# Patient Record
Sex: FEMALE | Race: OTHER
[De-identification: names, ages, dates, MRNs, and addresses within clinical notes are randomized per-mention and may not be internally consistent; named-entity substitution may affect disease eponyms.]

---

## 2017-04-19 ENCOUNTER — APPOINTMENT (OUTPATIENT)
Dept: OTOLARYNGOLOGY | Facility: CLINIC | Age: 62
End: 2017-04-19

## 2017-04-19 VITALS
HEART RATE: 94 BPM | OXYGEN SATURATION: 97 % | DIASTOLIC BLOOD PRESSURE: 80 MMHG | SYSTOLIC BLOOD PRESSURE: 126 MMHG | TEMPERATURE: 99.1 F

## 2017-04-19 DIAGNOSIS — J32.2 CHRONIC ETHMOIDAL SINUSITIS: ICD-10-CM

## 2017-04-19 RX ORDER — SULFAMETHOXAZOLE AND TRIMETHOPRIM 800; 160 MG/1; MG/1
800-160 TABLET ORAL
Qty: 20 | Refills: 0 | Status: ACTIVE | COMMUNITY
Start: 2017-04-19 | End: 1900-01-01

## 2017-04-19 RX ORDER — MONTELUKAST 10 MG/1
10 TABLET, FILM COATED ORAL
Qty: 30 | Refills: 6 | Status: ACTIVE | COMMUNITY
Start: 2017-04-19 | End: 1900-01-01

## 2017-05-10 ENCOUNTER — APPOINTMENT (OUTPATIENT)
Dept: OTOLARYNGOLOGY | Facility: CLINIC | Age: 62
End: 2017-05-10

## 2017-05-10 VITALS
HEART RATE: 92 BPM | SYSTOLIC BLOOD PRESSURE: 108 MMHG | TEMPERATURE: 98.5 F | OXYGEN SATURATION: 98 % | DIASTOLIC BLOOD PRESSURE: 73 MMHG

## 2017-05-10 DIAGNOSIS — J06.9 ACUTE UPPER RESPIRATORY INFECTION, UNSPECIFIED: ICD-10-CM

## 2017-09-25 ENCOUNTER — MEDICATION RENEWAL (OUTPATIENT)
Age: 62
End: 2017-09-25

## 2017-09-25 RX ORDER — MONTELUKAST 10 MG/1
10 TABLET, FILM COATED ORAL
Qty: 30 | Refills: 6 | Status: ACTIVE | COMMUNITY
Start: 2017-09-25 | End: 1900-01-01

## 2018-01-25 RX ORDER — AZITHROMYCIN 250 MG/1
250 TABLET, FILM COATED ORAL
Qty: 6 | Refills: 1 | Status: ACTIVE | COMMUNITY
Start: 2018-01-25 | End: 1900-01-01

## 2018-02-14 ENCOUNTER — APPOINTMENT (OUTPATIENT)
Dept: OTOLARYNGOLOGY | Facility: CLINIC | Age: 63
End: 2018-02-14
Payer: SELF-PAY

## 2018-02-14 VITALS
HEART RATE: 111 BPM | DIASTOLIC BLOOD PRESSURE: 82 MMHG | OXYGEN SATURATION: 96 % | TEMPERATURE: 98.4 F | SYSTOLIC BLOOD PRESSURE: 124 MMHG

## 2018-02-14 DIAGNOSIS — H92.03 OTALGIA, BILATERAL: ICD-10-CM

## 2018-02-14 PROCEDURE — 99214 OFFICE O/P EST MOD 30 MIN: CPT | Mod: 25

## 2018-02-14 PROCEDURE — 31231 NASAL ENDOSCOPY DX: CPT

## 2018-02-14 RX ORDER — TOPIRAMATE 50 MG/1
50 TABLET, FILM COATED ORAL
Qty: 30 | Refills: 0 | Status: ACTIVE | COMMUNITY
Start: 2017-11-24

## 2018-02-14 RX ORDER — CIPROFLOXACIN HYDROCHLORIDE 500 MG/1
500 TABLET, FILM COATED ORAL
Qty: 10 | Refills: 0 | Status: ACTIVE | COMMUNITY
Start: 2017-11-15

## 2018-02-14 RX ORDER — SULFAMETHOXAZOLE AND TRIMETHOPRIM 800; 160 MG/1; MG/1
800-160 TABLET ORAL
Qty: 20 | Refills: 0 | Status: ACTIVE | COMMUNITY
Start: 2018-02-14 | End: 1900-01-01

## 2018-02-14 RX ORDER — SUMATRIPTAN 100 MG/1
100 TABLET, FILM COATED ORAL
Qty: 9 | Refills: 0 | Status: ACTIVE | COMMUNITY
Start: 2017-09-26

## 2018-02-14 RX ORDER — METFORMIN ER 500 MG 500 MG/1
500 TABLET ORAL
Qty: 120 | Refills: 0 | Status: ACTIVE | COMMUNITY
Start: 2017-11-24

## 2018-02-14 RX ORDER — PEN NEEDLE, DIABETIC 29 G X1/2"
32G X 4 MM NEEDLE, DISPOSABLE MISCELLANEOUS
Qty: 100 | Refills: 0 | Status: ACTIVE | COMMUNITY
Start: 2017-11-03

## 2018-02-14 RX ORDER — TOPIRAMATE 25 MG/1
25 TABLET, FILM COATED ORAL
Qty: 60 | Refills: 0 | Status: ACTIVE | COMMUNITY
Start: 2017-08-27

## 2018-02-14 RX ORDER — OLMESARTAN MEDOXOMIL 40 MG/1
40 TABLET, FILM COATED ORAL
Qty: 30 | Refills: 0 | Status: ACTIVE | COMMUNITY
Start: 2017-10-24

## 2018-02-14 RX ORDER — ROSUVASTATIN CALCIUM 10 MG/1
10 TABLET, FILM COATED ORAL
Qty: 30 | Refills: 0 | Status: ACTIVE | COMMUNITY
Start: 2017-10-24

## 2018-02-14 RX ORDER — LIRAGLUTIDE 6 MG/ML
18 INJECTION SUBCUTANEOUS
Qty: 9 | Refills: 0 | Status: ACTIVE | COMMUNITY
Start: 2017-09-07

## 2018-02-21 ENCOUNTER — APPOINTMENT (OUTPATIENT)
Dept: OTOLARYNGOLOGY | Facility: CLINIC | Age: 63
End: 2018-02-21
Payer: SELF-PAY

## 2018-02-21 DIAGNOSIS — J30.1 ALLERGIC RHINITIS DUE TO POLLEN: ICD-10-CM

## 2018-02-21 PROCEDURE — 99213 OFFICE O/P EST LOW 20 MIN: CPT

## 2018-02-22 PROBLEM — J30.1 ALLERGY TO POLLEN: Status: ACTIVE | Noted: 2017-04-19

## 2018-02-24 ENCOUNTER — TRANSCRIPTION ENCOUNTER (OUTPATIENT)
Age: 63
End: 2018-02-24

## 2018-09-25 ENCOUNTER — APPOINTMENT (OUTPATIENT)
Dept: OTOLARYNGOLOGY | Facility: CLINIC | Age: 63
End: 2018-09-25
Payer: COMMERCIAL

## 2018-09-25 VITALS
TEMPERATURE: 98.3 F | OXYGEN SATURATION: 98 % | RESPIRATION RATE: 15 BRPM | HEART RATE: 109 BPM | DIASTOLIC BLOOD PRESSURE: 93 MMHG | SYSTOLIC BLOOD PRESSURE: 137 MMHG

## 2018-09-25 DIAGNOSIS — H90.3 SENSORINEURAL HEARING LOSS, BILATERAL: ICD-10-CM

## 2018-09-25 PROCEDURE — 92550 TYMPANOMETRY & REFLEX THRESH: CPT

## 2018-09-25 PROCEDURE — 99214 OFFICE O/P EST MOD 30 MIN: CPT

## 2018-09-25 PROCEDURE — 92557 COMPREHENSIVE HEARING TEST: CPT

## 2018-09-25 RX ORDER — MONTELUKAST 10 MG/1
10 TABLET, FILM COATED ORAL DAILY
Qty: 30 | Refills: 3 | Status: ACTIVE | COMMUNITY
Start: 2018-09-25 | End: 1900-01-01

## 2018-10-17 RX ORDER — AZITHROMYCIN 250 MG/1
250 TABLET, FILM COATED ORAL
Qty: 6 | Refills: 1 | Status: ACTIVE | COMMUNITY
Start: 2018-10-17 | End: 1900-01-01

## 2019-06-13 ENCOUNTER — APPOINTMENT (OUTPATIENT)
Dept: OTOLARYNGOLOGY | Facility: CLINIC | Age: 64
End: 2019-06-13
Payer: SELF-PAY

## 2019-06-13 VITALS
SYSTOLIC BLOOD PRESSURE: 138 MMHG | DIASTOLIC BLOOD PRESSURE: 83 MMHG | TEMPERATURE: 97.9 F | HEART RATE: 82 BPM | OXYGEN SATURATION: 98 %

## 2019-06-13 DIAGNOSIS — H61.22 IMPACTED CERUMEN, LEFT EAR: ICD-10-CM

## 2019-06-13 DIAGNOSIS — H90.3 SENSORINEURAL HEARING LOSS, BILATERAL: ICD-10-CM

## 2019-06-13 PROCEDURE — 99213 OFFICE O/P EST LOW 20 MIN: CPT

## 2019-06-14 PROBLEM — H90.3 SENSORY HEARING LOSS, BILATERAL: Status: ACTIVE | Noted: 2019-06-14

## 2019-06-14 PROBLEM — H61.22 CERUMINOSIS, LEFT: Status: ACTIVE | Noted: 2019-06-14

## 2019-06-14 NOTE — HISTORY OF PRESENT ILLNESS
[de-identified] : followup 65 yo woman with b snhl, severe-profound had been doing well with left Maxum middle ear implant until the past 2 weeks where she heard some unusual noise from the processor. She heard it briefly twice and is concerned the processor may be failing. She denies pain or drainage and also wished to discuss other options. No inciting factor. She has h/o recurrent sinusitis and ar but her nose has been relatively stable. Nonsmoker.

## 2019-06-14 NOTE — PHYSICAL EXAM
[de-identified] : b [de-identified] : l copious cerumen removed with suction [Normal] : mucosa is normal

## 2019-07-29 RX ORDER — MONTELUKAST 10 MG/1
10 TABLET, FILM COATED ORAL DAILY
Qty: 30 | Refills: 3 | Status: ACTIVE | COMMUNITY
Start: 2019-07-29 | End: 1900-01-01

## 2019-07-30 ENCOUNTER — TRANSCRIPTION ENCOUNTER (OUTPATIENT)
Age: 64
End: 2019-07-30

## 2019-09-20 ENCOUNTER — APPOINTMENT (OUTPATIENT)
Dept: OTOLARYNGOLOGY | Facility: CLINIC | Age: 64
End: 2019-09-20
Payer: SELF-PAY

## 2019-09-20 VITALS
RESPIRATION RATE: 16 BRPM | DIASTOLIC BLOOD PRESSURE: 75 MMHG | HEART RATE: 110 BPM | SYSTOLIC BLOOD PRESSURE: 118 MMHG | OXYGEN SATURATION: 97 %

## 2019-09-20 DIAGNOSIS — J30.1 ALLERGIC RHINITIS DUE TO POLLEN: ICD-10-CM

## 2019-09-20 DIAGNOSIS — H61.23 IMPACTED CERUMEN, BILATERAL: ICD-10-CM

## 2019-09-20 DIAGNOSIS — J32.2 CHRONIC ETHMOIDAL SINUSITIS: ICD-10-CM

## 2019-09-20 PROCEDURE — 99214 OFFICE O/P EST MOD 30 MIN: CPT | Mod: 25

## 2019-09-20 PROCEDURE — 31231 NASAL ENDOSCOPY DX: CPT

## 2019-09-20 NOTE — REASON FOR VISIT
[Subsequent Evaluation] : a subsequent evaluation for [FreeTextEntry2] : followup nasl congestion, blocked ears

## 2019-09-20 NOTE — HISTORY OF PRESENT ILLNESS
[de-identified] : followup 89 yo woman with nasal congestion - treated for sinus infection while traveling in [place] by 's relatives, feels as if her sinusitis has resolved but wishes to have nose checked to be sure. -f.s.c no draiange. mild congestion b. sx present 1 month resolved 1 week ago. nonsmoker.Has h/o severe-profound hl.

## 2019-09-20 NOTE — PHYSICAL EXAM
[Nasal Endoscopy Performed] : nasal endoscopy was performed, see procedure section for findings [de-identified] : mild congestion [Midline] : trachea located in midline position [Normal] : no rashes

## 2019-09-20 NOTE — PROCEDURE
[Topical Lidocaine] : topical lidocaine [Oxymetazoline HCl] : oxymetazoline HCl [Flexible Endoscope] : examined with the flexible endoscope [Serial Number: ___] : Serial Number: [unfilled] [Allergic] : allergic signs [Normal] : the paranasal sinuses had no abnormalities [FreeTextEntry6] : mild allergic congestion no purulence

## 2019-11-25 ENCOUNTER — APPOINTMENT (OUTPATIENT)
Dept: OTOLARYNGOLOGY | Facility: CLINIC | Age: 64
End: 2019-11-25
Payer: SELF-PAY

## 2019-11-25 ENCOUNTER — APPOINTMENT (OUTPATIENT)
Dept: OTOLARYNGOLOGY | Facility: CLINIC | Age: 64
End: 2019-11-25
Payer: COMMERCIAL

## 2019-11-25 VITALS
OXYGEN SATURATION: 96 % | DIASTOLIC BLOOD PRESSURE: 85 MMHG | TEMPERATURE: 98.3 F | SYSTOLIC BLOOD PRESSURE: 139 MMHG | HEART RATE: 81 BPM

## 2019-11-25 DIAGNOSIS — H90.3 SENSORINEURAL HEARING LOSS, BILATERAL: ICD-10-CM

## 2019-11-25 PROCEDURE — 92557 COMPREHENSIVE HEARING TEST: CPT

## 2019-11-25 PROCEDURE — 99212 OFFICE O/P EST SF 10 MIN: CPT

## 2019-11-25 PROCEDURE — 92550 TYMPANOMETRY & REFLEX THRESH: CPT

## 2019-11-26 PROBLEM — H90.3 SENSORY HEARING LOSS, BILATERAL: Status: ACTIVE | Noted: 2018-02-14

## 2019-11-26 NOTE — HISTORY OF PRESENT ILLNESS
[de-identified] : followup 65 yo woman with b snhl and meneire's and ar - currently she is here only for her hl which she feels has gotten worse. no inciting event. not sudden - it was slow progression over months from prior poor hearing.

## 2022-09-22 ENCOUNTER — APPOINTMENT (OUTPATIENT)
Dept: UROLOGY | Facility: CLINIC | Age: 67
End: 2022-09-22

## 2022-09-22 VITALS
WEIGHT: 178 LBS | HEART RATE: 115 BPM | BODY MASS INDEX: 29.66 KG/M2 | HEIGHT: 65 IN | TEMPERATURE: 97.2 F | SYSTOLIC BLOOD PRESSURE: 117 MMHG | OXYGEN SATURATION: 96 % | DIASTOLIC BLOOD PRESSURE: 88 MMHG

## 2022-09-22 DIAGNOSIS — N39.0 URINARY TRACT INFECTION, SITE NOT SPECIFIED: ICD-10-CM

## 2022-09-22 PROCEDURE — 99204 OFFICE O/P NEW MOD 45 MIN: CPT

## 2022-09-22 PROCEDURE — 51798 US URINE CAPACITY MEASURE: CPT

## 2022-09-22 NOTE — REVIEW OF SYSTEMS
[see HPI] : see HPI [Negative] : Heme/Lymph [Heartburn] : heartburn [Itching] : itching [Hot Flashes] : hot flashes

## 2022-09-23 LAB
APPEARANCE: CLEAR
BACTERIA: NEGATIVE
BILIRUBIN URINE: NEGATIVE
BLOOD URINE: NEGATIVE
COLOR: NORMAL
GLUCOSE QUALITATIVE U: NEGATIVE
HYALINE CASTS: 0 /LPF
KETONES URINE: NEGATIVE
LEUKOCYTE ESTERASE URINE: NEGATIVE
MICROSCOPIC-UA: NORMAL
NITRITE URINE: NEGATIVE
PH URINE: 6
PROTEIN URINE: NEGATIVE
RED BLOOD CELLS URINE: 0 /HPF
SPECIFIC GRAVITY URINE: 1.01
SQUAMOUS EPITHELIAL CELLS: 0 /HPF
UROBILINOGEN URINE: NORMAL
WHITE BLOOD CELLS URINE: 0 /HPF

## 2022-09-26 ENCOUNTER — APPOINTMENT (OUTPATIENT)
Dept: UROLOGY | Facility: CLINIC | Age: 67
End: 2022-09-26

## 2022-09-26 DIAGNOSIS — R35.0 FREQUENCY OF MICTURITION: ICD-10-CM

## 2022-09-26 LAB — BACTERIA UR CULT: NORMAL

## 2022-09-26 PROCEDURE — ZZZZZ: CPT | Mod: NC

## 2022-09-26 RX ORDER — MIRABEGRON 25 MG/1
25 TABLET, FILM COATED, EXTENDED RELEASE ORAL
Qty: 30 | Refills: 1 | Status: ACTIVE | COMMUNITY
Start: 2022-09-26 | End: 1900-01-01

## 2022-09-26 NOTE — REVIEW OF SYSTEMS
[Heartburn] : heartburn [see HPI] : see HPI [Itching] : itching [Hot Flashes] : hot flashes [Negative] : Heme/Lymph

## 2022-09-27 NOTE — ASSESSMENT
[FreeTextEntry1] : Pt is a 68 yo F who presents with frequency, urgency and sensation of burning while urinating.\par \par Assessment/ plan:   \par \par                            1- Send urine for analysis and culture to R/O infection\par                            2- If urine analysis positive RBC she will need cystoscopy\par                            3- Will follow up on Monday for culture results\par

## 2022-09-27 NOTE — LETTER BODY
[Dear  ___] : Dear  [unfilled], [Consult Letter:] : I had the pleasure of evaluating your patient, [unfilled]. [Please see my note below.] : Please see my note below. [Consult Closing:] : Thank you very much for allowing me to participate in the care of this patient.  If you have any questions, please do not hesitate to contact me. [Sincerely,] : Sincerely, [FreeTextEntry3] : Dr. Traci Dallas\par

## 2022-09-27 NOTE — ADDENDUM
[FreeTextEntry1] : A portion of this note was written by [Deepti Perez] on 09/22/2022  acting as a scribe for Dr. Dallas. \par \par I have personally reviewed the chart and agree that the record accurately reflects my personal performance and the history, physical exam, assessment, and plan.\par

## 2022-09-27 NOTE — HISTORY OF PRESENT ILLNESS
[FreeTextEntry1] : Pt is a 68 yo F  () who presents today with urinary frequency, referred by Dr. Natalya Asencio. Her history dates back 3 months when she started having urinary frequency, urgency and a sensation of burning (or inflamed urethra as she described) while urinating. Her GYN started her on nitrofurantoin and her symptoms slowly improved.  1 month ago the same symptoms returned and a culture was negative.  However, she was given another round of antibiotics, (nitrofurantoin).  Today she is presenting with the same symptoms. \par \par Udip: negative\par \par PMH: high blood pressure, sinusitis\par PSH:  (), sinus surgery (), cochlear implant ()\par FH: denies genitourinary malignancies\par SH: non-smoker, no alcohol, \par \par Allergies: NKDA\par \par Meds: losartan, crestor, trazodone, topiramate, sumatriptan PRN, fluocinonide cream  \par

## 2022-10-03 ENCOUNTER — NON-APPOINTMENT (OUTPATIENT)
Age: 67
End: 2022-10-03

## 2022-10-04 RX ORDER — TROSPIUM CHLORIDE 60 MG/1
60 CAPSULE, EXTENDED RELEASE ORAL
Qty: 30 | Refills: 1 | Status: ACTIVE | COMMUNITY
Start: 2022-10-04 | End: 1900-01-01

## 2022-10-05 RX ORDER — FESOTERODINE FUMARATE 4 MG/1
4 TABLET, FILM COATED, EXTENDED RELEASE ORAL
Qty: 30 | Refills: 2 | Status: ACTIVE | COMMUNITY
Start: 2022-10-05 | End: 1900-01-01

## 2022-10-06 NOTE — ADDENDUM
[FreeTextEntry1] : A portion of this note was written by [Deepti Perez] on 09/26/2022  acting as a scribe for Dr. Dallas. \par \par I have personally reviewed the chart and agree that the record accurately reflects my personal performance and the history, physical exam, assessment, and plan.\par

## 2022-10-06 NOTE — ASSESSMENT
[FreeTextEntry1] : Pt is a 68 yo F who presents today for a telephonic visit to discuss recent UA and urine culture from 9/22/22, both of which were negative and did not show any RBCs. Pt reports she is still experiencing urinary frequency, occasional urinary incontinence, and a sensation of urinary irritation/inflammation - states that symptoms are always present, but ebb and flow. She is not currently using estrogen cream. \par \par Pt agreed to begin Myrbetriq 25mg for OAB. She will follow-up with me in a month to assess her progress. If urinary symptoms persist at that time, we can consider adding estrogen cream as a secondary step. \par \par Patient expressed understanding.\par

## 2022-10-06 NOTE — HISTORY OF PRESENT ILLNESS
[Home] : at home, [unfilled] , at the time of the visit. [Medical Office: ([facility])___] : at the medical office located in  [FreeTextEntry1] : Pt is a 68 yo F who presents today for a telephonic visit to discuss recent UA and urine culture from 22, both of which were negative and did not show any RBCs. Pt reports she is still experiencing urinary frequency, occasional urinary incontinence, and a sensation of urinary irritation/inflammation - states that symptoms are always present, but ebb and flow. She is not currently using estrogen cream. \par \par \par 22-- Pt is a 68 yo F  () who presents today with urinary frequency, referred by Dr. Natalya Asencio. keithernesto goes back to 3 months when she started having frequency, urgency and sensation of burning (or inflamed urethra as she described) while urinating. He GYN started her on nitrofurantoin she had a little improvement. then her symptoms got better. then 1 month ago she had the same symptoms and culture was negative but still got another course of nitro.\par today she is presenting with the same symptoms  \par \par Udip: negative\par \par PMH: high blood pressure, sinusitis\par PSH:  (), sinus surgery (), cochlear implant (), gynecological various \par FH: denies genitourinary malignancies\par SH: non-smoker, no alcohol, \par \par Allergies: NKDA\par \par Meds: losartan, crestor, trazodone, topiramate, sumatriptan PRN, fluocinonide cream  \par

## 2022-11-08 ENCOUNTER — APPOINTMENT (OUTPATIENT)
Dept: UROLOGY | Facility: CLINIC | Age: 67
End: 2022-11-08

## 2022-11-08 VITALS
HEART RATE: 93 BPM | TEMPERATURE: 98.2 F | SYSTOLIC BLOOD PRESSURE: 126 MMHG | DIASTOLIC BLOOD PRESSURE: 85 MMHG | OXYGEN SATURATION: 93 %

## 2022-11-08 LAB
BILIRUB UR QL STRIP: NORMAL
CLARITY UR: CLEAR
COLLECTION METHOD: NORMAL
GLUCOSE UR-MCNC: NORMAL
HCG UR QL: 0.2 EU/DL
HGB UR QL STRIP.AUTO: NORMAL
KETONES UR-MCNC: NORMAL
LEUKOCYTE ESTERASE UR QL STRIP: NORMAL
NITRITE UR QL STRIP: NORMAL
PH UR STRIP: 5
PROT UR STRIP-MCNC: NORMAL
SP GR UR STRIP: 1.02

## 2022-11-08 PROCEDURE — 51798 US URINE CAPACITY MEASURE: CPT

## 2022-11-08 PROCEDURE — 99213 OFFICE O/P EST LOW 20 MIN: CPT

## 2022-11-08 NOTE — ASSESSMENT
[FreeTextEntry1] : Pt is a 66 yo F with urinary frequency, occasional urinary incontinence, and a sensation of urinary irritation/inflammation who presents today for a follow-up visit to assess her progress on Toviaz 4mg. \par \par Today Pt reports that the Toviaz is providing some urinary symptom relief. \par She is experiencing some side effects from the medication, including dry mouth and occasional constipation. \par \par We discussed the possibility of her starting a trial of Trospium, but at this time, Pt would like to remain on Toviaz.\par \par Pt will continue to take Toviaz 4mg for another 1-2 months, and will follow-up to assess her progress. \par \par Patient expressed understanding.\par \par \par \par

## 2022-11-08 NOTE — ADDENDUM
[FreeTextEntry1] : A portion of this note was written by [Deepti Perez] on 11/08/2022  acting as a scribe for Dr. Dallas. \par \par I have personally reviewed the chart and agree that the record accurately reflects my personal performance and the history, physical exam, assessment, and plan.\par

## 2022-11-08 NOTE — HISTORY OF PRESENT ILLNESS
[FreeTextEntry1] : Pt is a 68 yo F with urinary frequency, occasional urinary incontinence, and a sensation of urinary irritation/inflammation who presents today for a follow-up visit to assess her progress on Toviaz 4mg. She was initially prescribed Myrbetriq 25mg on 22, but it was not covered by her insurance. \par \par Today Pt reports that the Toviaz is providing some urinary symptom relief - she is no longer experiencing irritation while urinating, and her frequency has been slightly reduced. On Toviaz she is experiencing nocturia x 2-3, whereas previously it was x 3-4. She is experiencing some side effects from the medication, including dry mouth and occasional constipation. \par \par Udip: negative \par PVR: 0 cc (to rule out incomplete bladder emptying)\par \par 22-- Pt is a 68 yo F who presents today for a telephonic visit to discuss recent UA and urine culture from 22, both of which were negative and did not show any RBCs. Pt reports she is still experiencing urinary frequency, occasional urinary incontinence, and a sensation of urinary irritation/inflammation - states that symptoms are always present, but ebb and flow. She is not currently using estrogen cream. \par \par \par 22-- Pt is a 68 yo F  () who presents today with urinary frequency, referred by Dr. Natalya Asencio. sarina goes back to 3 months when she started having frequency, urgency and sensation of burning (or inflamed urethra as she described) while urinating. He GYN started her on nitrofurantoin she had a little improvement. then her symptoms got better. then 1 month ago she had the same symptoms and culture was negative but still got another course of nitro.\par today she is presenting with the same symptoms  \par \par Udip: negative\par \par PMH: high blood pressure, sinusitis\par PSH:  (), sinus surgery (), cochlear implant (), gynecological various \par FH: denies genitourinary malignancies\par SH: non-smoker, no alcohol, \par \par Allergies: NKDA\par \par Meds: losartan, crestor, trazodone, topiramate, sumatriptan PRN, fluocinonide cream  \par

## 2023-01-08 ENCOUNTER — NON-APPOINTMENT (OUTPATIENT)
Age: 68
End: 2023-01-08

## 2023-01-13 ENCOUNTER — APPOINTMENT (OUTPATIENT)
Dept: PLASTIC SURGERY | Facility: CLINIC | Age: 68
End: 2023-01-13
Payer: SELF-PAY

## 2023-01-13 ENCOUNTER — APPOINTMENT (OUTPATIENT)
Dept: PLASTIC SURGERY | Facility: CLINIC | Age: 68
End: 2023-01-13
Payer: MEDICARE

## 2023-01-13 VITALS
DIASTOLIC BLOOD PRESSURE: 87 MMHG | WEIGHT: 180 LBS | SYSTOLIC BLOOD PRESSURE: 139 MMHG | HEART RATE: 94 BPM | TEMPERATURE: 98 F | BODY MASS INDEX: 29.99 KG/M2 | HEIGHT: 65 IN | OXYGEN SATURATION: 99 %

## 2023-01-13 PROCEDURE — 99203 OFFICE O/P NEW LOW 30 MIN: CPT | Mod: NC

## 2023-01-13 PROCEDURE — 99213 OFFICE O/P EST LOW 20 MIN: CPT | Mod: 25

## 2023-01-30 NOTE — REVIEW OF SYSTEMS
Upper endoscopy yesterday, now complains of left neck since 0130, sharp throbbing. Pain  A 10. She tried Tylenol at home and warm pack, no relief. She denies bloody cough or any type of discharge.   [Negative] : Heme/Lymph

## 2023-01-30 NOTE — HISTORY OF PRESENT ILLNESS
[FreeTextEntry1] : APOORVA has a lesion on the lower lip that she desires removal and biopsy of. The risks, benefits, alternatives, limitations and the permanent scars were outlined with the patient. All of APOORVA 's questions and concerns were addressed and answered completely The instructions were reviewed in detail with APOORVA.

## 2023-01-30 NOTE — HISTORY OF PRESENT ILLNESS
[FreeTextEntry1] : APOORVA has rhytids and we discussed botulinum toxin APOORVA MONTERO is complaining of dynamic rhytids of the face and desires botulinum toxin for temporary reduction in these rhytids.  The risks benefits alternatives limitations and permanent scars were outlined with her . \par \par

## 2023-01-30 NOTE — ASSESSMENT
[FreeTextEntry1] : APOORVA will return for excision and repair of a lesion on the central lower lip  permanent scar outlined All of APOORVA 's questions and concerns were addressed and answered completely

## 2023-01-30 NOTE — ASSESSMENT
[FreeTextEntry1] : APOORVA WINSTON  was here for procedural Botox consultation .  She  will return for next dose in 4-5 months.  Instructions were reviewed.

## 2023-02-06 RX ORDER — CEPHALEXIN 500 MG/1
500 TABLET ORAL
Qty: 20 | Refills: 0 | Status: ACTIVE | COMMUNITY
Start: 2023-02-06 | End: 1900-01-01

## 2023-02-09 ENCOUNTER — RESULT REVIEW (OUTPATIENT)
Age: 68
End: 2023-02-09

## 2023-02-09 ENCOUNTER — APPOINTMENT (OUTPATIENT)
Dept: PLASTIC SURGERY | Facility: CLINIC | Age: 68
End: 2023-02-09
Payer: MEDICARE

## 2023-02-09 VITALS
DIASTOLIC BLOOD PRESSURE: 87 MMHG | HEART RATE: 90 BPM | TEMPERATURE: 98.1 F | RESPIRATION RATE: 18 BRPM | SYSTOLIC BLOOD PRESSURE: 142 MMHG | OXYGEN SATURATION: 100 %

## 2023-02-09 PROCEDURE — 64612 DESTROY NERVE FACE MUSCLE: CPT

## 2023-02-09 PROCEDURE — 13151 CMPLX RPR E/N/E/L 1.1-2.5 CM: CPT | Mod: 59

## 2023-02-10 NOTE — ASSESSMENT
[FreeTextEntry1] : APOORVA MONTERO  was here for procedural Botox injection.  She  tolerated the procedure well and will return for next dose in 4-5 months.  Instructions were reviewed.

## 2023-02-10 NOTE — PROCEDURE
[FreeTextEntry6] : APOORVA MONTERO is complaining of dynamic rhytids of the face and desires botulinum toxin for temporary reduction in these rhytids.  The risks benefits alternatives limitations and permanent scars were outlined with her . Under aseptic conditions, Botulinum Toxin was administered in the desired area. Please see face sheet for lot , site and dose information. \par \par Please see the scanned face sheet for lot and dose information. Aseptic administration of botox to indicated areas of the face.  See external sheet for lot and dose information

## 2023-02-10 NOTE — ASSESSMENT
[FreeTextEntry1] : APOORVA tolerated the procedure well. APOORVA  has had uncomplicated recovery from surgery and anesthesia The instructions were reviewed in detail with APOORVA. APOORVA will return to the office for a post procedure visit

## 2023-02-10 NOTE — PROCEDURE
[Nl] : None [FreeTextEntry1] : pigmented lesion lower lip  [FreeTextEntry2] : excision lesion lower lip 1 cm complex repair  [FreeTextEntry6] : APOORVA MONTERO 67 year old w F with a lesion on the lower lip     She  is here for excisional biopsy.  The risks benefits alternatives limitations and the permanent scars were outlined with APOORVA.  All questions were answered.  Under aseptic conditions and local anesthetic, the lesion was excised with a 15 blade and undermining was performed for a tension free closure.  Hemostasis was obtained with electrocautery and the wound was repaired with gut sutures.  She tolerated the procedure well and was given a sterile occlusive dressing and instructions were reviewed.  The pathology specimen was placed in formalin and submitted for permanent section pathology. A follow up appointment was given for suture removal and follow up.

## 2023-02-14 ENCOUNTER — APPOINTMENT (OUTPATIENT)
Dept: PLASTIC SURGERY | Facility: CLINIC | Age: 68
End: 2023-02-14
Payer: SELF-PAY

## 2023-02-14 DIAGNOSIS — L98.8 OTHER SPECIFIED DISORDERS OF THE SKIN AND SUBCUTANEOUS TISSUE: ICD-10-CM

## 2023-02-14 PROCEDURE — 99024 POSTOP FOLLOW-UP VISIT: CPT

## 2023-02-15 PROBLEM — L98.8 FACIAL RHYTIDS: Status: ACTIVE | Noted: 2023-01-30

## 2023-02-15 NOTE — ASSESSMENT
[FreeTextEntry1] : APOORVA tolerated the procedure well. The instructions were reviewed in detail with APOORVA.

## 2023-02-15 NOTE — HISTORY OF PRESENT ILLNESS
[FreeTextEntry1] : Aseptic administration of touch up botox to indicated areas of the face on the forehead .  See external sheet for lot and dose information

## 2023-02-16 ENCOUNTER — APPOINTMENT (OUTPATIENT)
Dept: PLASTIC SURGERY | Facility: CLINIC | Age: 68
End: 2023-02-16
Payer: MEDICARE

## 2023-02-16 DIAGNOSIS — L81.9 DISORDER OF PIGMENTATION, UNSPECIFIED: ICD-10-CM

## 2023-02-16 PROCEDURE — 99024 POSTOP FOLLOW-UP VISIT: CPT

## 2023-03-02 ENCOUNTER — NON-APPOINTMENT (OUTPATIENT)
Age: 68
End: 2023-03-02

## 2023-03-15 PROBLEM — L81.9 PIGMENTED SKIN LESION OF UNCERTAIN NATURE: Status: ACTIVE | Noted: 2023-01-30

## 2023-03-15 NOTE — HISTORY OF PRESENT ILLNESS
[FreeTextEntry1] : APOORVA had excision of pigmented area lower lip and botox last week  she is well healed All of APOORVA's incisions are clean dry and healing well.  Her  sutures were removed and areas steri stripped. path is still pending

## 2023-03-15 NOTE — ASSESSMENT
[FreeTextEntry1] : APOORVA was given a touch up of botox to the forehead APOORVA tolerated the procedure well. The instructions were reviewed in detail with APOORVA. awaiting path report

## 2023-05-02 RX ORDER — FESOTERODINE FUMARATE 4 MG/1
4 TABLET, FILM COATED, EXTENDED RELEASE ORAL
Qty: 90 | Refills: 3 | Status: ACTIVE | COMMUNITY
Start: 2022-10-04 | End: 1900-01-01

## 2023-07-12 ENCOUNTER — NON-APPOINTMENT (OUTPATIENT)
Age: 68
End: 2023-07-12

## 2025-05-13 RX ORDER — SODIUM CHLORIDE 9 G/1000ML
1000 INJECTION, SOLUTION INTRAVENOUS
Refills: 0 | Status: DISCONTINUED | OUTPATIENT
Start: 2025-05-15 | End: 2025-05-15

## 2025-05-14 NOTE — ASU PATIENT PROFILE, ADULT - NS PREOP UNDERSTANDS INFO
We are located at [address] [place] [address], between [address] on the first floor,  parking is 22/8 hours, bring picture ID, insurance card, Debit or credit card, escort needs picture ID, No solids or dairy products after midnight, water until 0730. No make-up, jewelry, creams lotions powders, colognes, hard candies, chewing gum, nail polish. Wear comfortable clothing  Call back [phone]/yes

## 2025-05-15 ENCOUNTER — OUTPATIENT (OUTPATIENT)
Dept: OUTPATIENT SERVICES | Facility: HOSPITAL | Age: 70
LOS: 1 days | Discharge: HOME | End: 2025-05-15

## 2025-05-15 ENCOUNTER — TRANSCRIPTION ENCOUNTER (OUTPATIENT)
Age: 70
End: 2025-05-15

## 2025-05-15 VITALS
TEMPERATURE: 98 F | RESPIRATION RATE: 16 BRPM | DIASTOLIC BLOOD PRESSURE: 84 MMHG | SYSTOLIC BLOOD PRESSURE: 142 MMHG | OXYGEN SATURATION: 98 % | HEART RATE: 89 BPM | WEIGHT: 180.34 LBS | HEIGHT: 65 IN

## 2025-05-15 VITALS
DIASTOLIC BLOOD PRESSURE: 68 MMHG | RESPIRATION RATE: 16 BRPM | HEART RATE: 84 BPM | OXYGEN SATURATION: 98 % | SYSTOLIC BLOOD PRESSURE: 121 MMHG | TEMPERATURE: 97 F

## 2025-05-15 DIAGNOSIS — Z96.21 COCHLEAR IMPLANT STATUS: Chronic | ICD-10-CM

## 2025-05-15 DEVICE — IMPLANTABLE DEVICE
Type: IMPLANTABLE DEVICE | Site: LEFT | Status: NON-FUNCTIONAL
Removed: 2025-05-15

## 2025-05-15 RX ORDER — TROPICAMIDE
1 POWDER (GRAM) MISCELLANEOUS
Refills: 0 | Status: COMPLETED | OUTPATIENT
Start: 2025-05-15 | End: 2025-05-15

## 2025-05-15 RX ORDER — OFLOXACIN 3 MG/ML
1 SOLUTION OPHTHALMIC
Refills: 0 | Status: COMPLETED | OUTPATIENT
Start: 2025-05-15 | End: 2025-05-15

## 2025-05-15 RX ORDER — SODIUM CHLORIDE 9 G/1000ML
1000 INJECTION, SOLUTION INTRAVENOUS
Refills: 0 | Status: DISCONTINUED | OUTPATIENT
Start: 2025-05-15 | End: 2025-05-15

## 2025-05-15 RX ORDER — FENTANYL CITRATE-0.9 % NACL/PF 100MCG/2ML
25 SYRINGE (ML) INTRAVENOUS
Refills: 0 | Status: DISCONTINUED | OUTPATIENT
Start: 2025-05-15 | End: 2025-05-15

## 2025-05-15 RX ORDER — KETOROLAC TROMETHAMINE 5 MG/ML
1 SOLUTION/ DROPS OPHTHALMIC
Refills: 0 | Status: COMPLETED | OUTPATIENT
Start: 2025-05-15 | End: 2025-05-15

## 2025-05-15 RX ORDER — CHLOROQUINE PHOSPHATE
1 POWDER (GRAM) MISCELLANEOUS
Refills: 0 | Status: COMPLETED | OUTPATIENT
Start: 2025-05-15 | End: 2025-05-15

## 2025-05-15 RX ORDER — PHENYLEPHRINE HYDROCHLORIDE 25 MG/ML
1 SOLUTION OPHTHALMIC
Refills: 0 | Status: COMPLETED | OUTPATIENT
Start: 2025-05-15 | End: 2025-05-15

## 2025-05-15 RX ADMIN — Medication 1 DROP(S): at 10:00

## 2025-05-15 RX ADMIN — OFLOXACIN 1 DROP(S): 3 SOLUTION OPHTHALMIC at 10:00

## 2025-05-15 RX ADMIN — Medication 1 DROP(S): at 10:10

## 2025-05-15 RX ADMIN — PHENYLEPHRINE HYDROCHLORIDE 1 DROP(S): 25 SOLUTION OPHTHALMIC at 10:00

## 2025-05-15 RX ADMIN — KETOROLAC TROMETHAMINE 1 DROP(S): 5 SOLUTION/ DROPS OPHTHALMIC at 10:05

## 2025-05-15 RX ADMIN — Medication 1 DROP(S): at 10:05

## 2025-05-15 RX ADMIN — OFLOXACIN 1 DROP(S): 3 SOLUTION OPHTHALMIC at 10:05

## 2025-05-15 RX ADMIN — KETOROLAC TROMETHAMINE 1 DROP(S): 5 SOLUTION/ DROPS OPHTHALMIC at 10:10

## 2025-05-15 RX ADMIN — OFLOXACIN 1 DROP(S): 3 SOLUTION OPHTHALMIC at 10:10

## 2025-05-15 RX ADMIN — PHENYLEPHRINE HYDROCHLORIDE 1 DROP(S): 25 SOLUTION OPHTHALMIC at 10:05

## 2025-05-15 RX ADMIN — PHENYLEPHRINE HYDROCHLORIDE 1 DROP(S): 25 SOLUTION OPHTHALMIC at 10:10

## 2025-05-15 RX ADMIN — KETOROLAC TROMETHAMINE 1 DROP(S): 5 SOLUTION/ DROPS OPHTHALMIC at 10:00

## 2025-05-15 NOTE — ASU DISCHARGE PLAN (ADULT/PEDIATRIC) - NS MD DC FALL RISK RISK
For information on Fall & Injury Prevention, visit: [email or web address] OR  [email or web address] OR  https://www.cdc.gov/steadi/patient.html

## 2025-05-15 NOTE — ASU DISCHARGE PLAN (ADULT/PEDIATRIC) - FINANCIAL ASSISTANCE
[facility] provides services at a reduced cost to those who are determined to be eligible through [facility]’s financial assistance program. Information regarding [facility]’s financial assistance program can be found by going to [email or web address] or by calling [phone].

## 2025-05-16 NOTE — OPERATIVE REPORT - OPERATIVE RPOSRT DETAILS
PROCEDURE DATE:05-    OPERATION:  Phacoemulsification with lens implant, LEFT eye, plus femto laser plus ORA, LEFT eye.    PREOPERATIVE DIAGNOSES:  Nuclear sclerotic cataract- LEFT eye    POSTOPERATIVE DIAGNOSES:  Nuclear sclerotic cataract - LEFT eye    DESCRIPTION OF PROCEDURE: The patient was brought into the femto room.  The eye was then marked at 3 and 9 o'clock positions at the limbus with a marking pen and tetracaine.  The patient was placed in the prone position for femto laser to the left eye.  Suction cup placed on the left eye and docked to the laser.  Capsulorrhexis, nuclear fragmentation, and LRI was performed.  Suction was removed and femto procedure was finished.      The patient was brought into the operating room where MAC anesthesia was induced and the patient was prepped and draped in the usual manner for sterile ophthalmic surgery.  A Santosh speculum was placed into the eye and stabilized with two 4x4s.  Topical lidocaine 4% was instilled over the cornea.  The surgeon sat temporally.  A paracentesis was made and intraocular lidocaine 1% was instilled into the anterior chamber.  Epinephrine was instilled.  Healon GV was then placed into the anterior chamber. A 2.4mm blade was then used to fashion a biplanar incision temporally.    The femto capsulotomy was then removed without complication.  Hydrodissection and hydrodelineation of the lens was then performed using BSS on a flat-tip cannula.  Phacoemulsification of the nucleus was then performed by sculpting the nucleus in quarters.  The quarters were split with a bimanual technique. Viscoat was then placed into the crack to enlarge the cracks and to shield the endothelium and posterior capsule.  Phacoemulsification of the 4 quadrants was then performed without complication.  Hydrodissection of the cortical material with flat tip canula with BSS performed.    Mechanical irrigation and aspiration was then performed to remove any remaining cortical material.  Polishing of the anterior and posterior capsules was then performed.  The bag was then inflated using Healon to separate the anterior and posterior leaflets.  ORA was performed.  A +19.0 diopter, Odyssey [id number] lens was placed into the bag.   Rotation of the lens to ensure proper placement was performed.  Irrigation and aspiration of the remaining viscoelastic material was then performed with gentle tapping of the lens to ensure that no viscoelastic material was caught behind the lens.  Miochol was inserted into the eye and after insertion, a round pupil was noted at the end of the case.    Careful attention to ensure that there was no wound leak was performed.  A speculum was removed.  A drop of TobraDex was placed into the eye.  A clear shield was then taped over the eye.  The patient returned to the recovery room in stable and improved condition.

## 2025-05-27 PROBLEM — I10 ESSENTIAL (PRIMARY) HYPERTENSION: Chronic | Status: ACTIVE | Noted: 2025-05-15

## 2025-05-27 PROBLEM — Z86.69 PERSONAL HISTORY OF OTHER DISEASES OF THE NERVOUS SYSTEM AND SENSE ORGANS: Chronic | Status: ACTIVE | Noted: 2025-05-15

## 2025-05-27 RX ORDER — SODIUM CHLORIDE 9 G/1000ML
1000 INJECTION, SOLUTION INTRAVENOUS
Refills: 0 | Status: DISCONTINUED | OUTPATIENT
Start: 2025-05-29 | End: 2025-05-30

## 2025-05-28 NOTE — ASU PATIENT PROFILE, ADULT - ALCOHOL USE HISTORY SINGLE SELECT
Throbbing pain in neck with hypersensitive skin in that area x 12 days ,denies trauma or event    yes...

## 2025-05-28 NOTE — ASU PATIENT PROFILE, ADULT - AS SC BRADEN MOISTURE
"From: Deneen Lombard  To: Vanita Vasquez MD  Sent: 12/7/2018 1:16 PM CST  Subject: Referral Request    I have completed a 10 treatment with Doxycycline for (assumed) ? Bronchitis which seemed to help some. Now the ""wet\"" cough is back. I have some ankle edema and a little SOB when walking the equivalent of a block or two. I am scheduled for bladder surgery on January 22 and a pre-op exam with you for on January 8. I will be away from the afternoon of January 8 until January 17. I would feel more secure if I have had a chest x-ray before that time. May I have a referral for that? Thank you.   Sister Dot Cornejo  [birth year]  " (4) rarely moist

## 2025-05-28 NOTE — ASU PATIENT PROFILE, ADULT - NSICDXPASTMEDICALHX_GEN_ALL_CORE_FT
PAST MEDICAL HISTORY:  H/O migraine     High cholesterol     Hypertension      PAST MEDICAL HISTORY:  GERD (gastroesophageal reflux disease)     H/O migraine     High cholesterol     Hypertension

## 2025-05-28 NOTE — ASU PATIENT PROFILE, ADULT - ABLE TO REACH PT
[phone] Arrival 0800 We are located at [address] [place] [address], between [address] on the first floor,  parking is 22/8 hours, bring picture ID, insurance card, Debit or credit card, escort needs picture ID, No solids or dairy products after midnight, water until 0630. Meds a/p MD instructions No make-up, jewelry, creams lotions powders, colognes, hard candies, chewing gum. Wear comfortable clothing  Call back [phone]/no  [phone] Arrival 0800 We are located at [address] [place] [address], between [address] on the first floor,  parking is 22/8 hours, bring picture ID, insurance card, Debit or credit card, escort needs picture ID, No solids or dairy products after midnight, water until 0630. Meds a/p MD instructions No make-up, jewelry, creams lotions powders, colognes, hard candies, chewing gum. Wear comfortable clothing  Call back [phone]/yes

## 2025-05-28 NOTE — ASU PATIENT PROFILE, ADULT - NSICDXPASTSURGICALHX_GEN_ALL_CORE_FT
PAST SURGICAL HISTORY:  History of cochlear implant     Status post cataract extraction and insertion of intraocular lens of left eye      PAST SURGICAL HISTORY:  H/O  section     H/O sinus surgery     History of cochlear implant     Status post cataract extraction and insertion of intraocular lens of left eye

## 2025-05-29 ENCOUNTER — OUTPATIENT (OUTPATIENT)
Dept: OUTPATIENT SERVICES | Facility: HOSPITAL | Age: 70
LOS: 1 days | Discharge: HOME | End: 2025-05-29

## 2025-05-29 ENCOUNTER — TRANSCRIPTION ENCOUNTER (OUTPATIENT)
Age: 70
End: 2025-05-29

## 2025-05-29 VITALS
SYSTOLIC BLOOD PRESSURE: 111 MMHG | DIASTOLIC BLOOD PRESSURE: 66 MMHG | RESPIRATION RATE: 16 BRPM | HEART RATE: 82 BPM | TEMPERATURE: 97 F | OXYGEN SATURATION: 96 %

## 2025-05-29 VITALS
DIASTOLIC BLOOD PRESSURE: 77 MMHG | RESPIRATION RATE: 16 BRPM | TEMPERATURE: 97 F | WEIGHT: 181 LBS | SYSTOLIC BLOOD PRESSURE: 133 MMHG | OXYGEN SATURATION: 97 % | HEIGHT: 65 IN | HEART RATE: 86 BPM

## 2025-05-29 DIAGNOSIS — Z96.21 COCHLEAR IMPLANT STATUS: Chronic | ICD-10-CM

## 2025-05-29 DIAGNOSIS — Z98.891 HISTORY OF UTERINE SCAR FROM PREVIOUS SURGERY: Chronic | ICD-10-CM

## 2025-05-29 DIAGNOSIS — Z98.890 OTHER SPECIFIED POSTPROCEDURAL STATES: Chronic | ICD-10-CM

## 2025-05-29 DIAGNOSIS — Z98.42 CATARACT EXTRACTION STATUS, LEFT EYE: Chronic | ICD-10-CM

## 2025-05-29 DEVICE — IMPLANTABLE DEVICE
Type: IMPLANTABLE DEVICE | Site: RIGHT | Status: NON-FUNCTIONAL
Removed: 2025-05-29

## 2025-05-29 RX ORDER — SEMAGLUTIDE 1 MG/.5ML
1 INJECTION, SOLUTION SUBCUTANEOUS
Refills: 0 | DISCHARGE

## 2025-05-29 RX ORDER — ACETAMINOPHEN 500 MG/5ML
1000 LIQUID (ML) ORAL ONCE
Refills: 0 | Status: DISCONTINUED | OUTPATIENT
Start: 2025-05-29 | End: 2025-05-30

## 2025-05-29 RX ORDER — TRAZODONE HCL 100 MG
0 TABLET ORAL
Refills: 0 | DISCHARGE

## 2025-05-29 RX ORDER — AMLODIPINE BESYLATE 10 MG/1
1 TABLET ORAL
Refills: 0 | DISCHARGE

## 2025-05-29 RX ORDER — ROSUVASTATIN CALCIUM 20 MG/1
1 TABLET, FILM COATED ORAL
Refills: 0 | DISCHARGE

## 2025-05-29 RX ORDER — KETOROLAC TROMETHAMINE 5 MG/ML
1 SOLUTION/ DROPS OPHTHALMIC
Refills: 0 | Status: COMPLETED | OUTPATIENT
Start: 2025-05-29 | End: 2025-05-29

## 2025-05-29 RX ORDER — FOLIC ACID 1 MG/1
0 TABLET ORAL
Refills: 0 | DISCHARGE

## 2025-05-29 RX ORDER — CHLOROQUINE PHOSPHATE
1 POWDER (GRAM) MISCELLANEOUS
Refills: 0 | Status: COMPLETED | OUTPATIENT
Start: 2025-05-29 | End: 2025-05-29

## 2025-05-29 RX ORDER — OFLOXACIN 3 MG/ML
1 SOLUTION OPHTHALMIC
Refills: 0 | Status: COMPLETED | OUTPATIENT
Start: 2025-05-29 | End: 2025-05-29

## 2025-05-29 RX ORDER — TOPIRAMATE 25 MG/1
1 TABLET, FILM COATED ORAL
Refills: 0 | DISCHARGE

## 2025-05-29 RX ORDER — PHENYLEPHRINE HYDROCHLORIDE 25 MG/ML
1 SOLUTION OPHTHALMIC
Refills: 0 | Status: COMPLETED | OUTPATIENT
Start: 2025-05-29 | End: 2025-05-29

## 2025-05-29 RX ORDER — LOSARTAN POTASSIUM 100 MG/1
1 TABLET, FILM COATED ORAL
Refills: 0 | DISCHARGE

## 2025-05-29 RX ORDER — TROPICAMIDE
1 POWDER (GRAM) MISCELLANEOUS
Refills: 0 | Status: COMPLETED | OUTPATIENT
Start: 2025-05-29 | End: 2025-05-29

## 2025-05-29 RX ORDER — OMEPRAZOLE 20 MG/1
1 CAPSULE, DELAYED RELEASE ORAL
Refills: 0 | DISCHARGE

## 2025-05-29 RX ADMIN — KETOROLAC TROMETHAMINE 1 DROP(S): 5 SOLUTION/ DROPS OPHTHALMIC at 07:40

## 2025-05-29 RX ADMIN — OFLOXACIN 1 DROP(S): 3 SOLUTION OPHTHALMIC at 07:35

## 2025-05-29 RX ADMIN — Medication 1 DROP(S): at 07:40

## 2025-05-29 RX ADMIN — OFLOXACIN 1 DROP(S): 3 SOLUTION OPHTHALMIC at 07:40

## 2025-05-29 RX ADMIN — PHENYLEPHRINE HYDROCHLORIDE 1 DROP(S): 25 SOLUTION OPHTHALMIC at 07:30

## 2025-05-29 RX ADMIN — PHENYLEPHRINE HYDROCHLORIDE 1 DROP(S): 25 SOLUTION OPHTHALMIC at 07:35

## 2025-05-29 RX ADMIN — OFLOXACIN 1 DROP(S): 3 SOLUTION OPHTHALMIC at 07:30

## 2025-05-29 RX ADMIN — Medication 1 DROP(S): at 07:30

## 2025-05-29 RX ADMIN — KETOROLAC TROMETHAMINE 1 DROP(S): 5 SOLUTION/ DROPS OPHTHALMIC at 07:30

## 2025-05-29 RX ADMIN — KETOROLAC TROMETHAMINE 1 DROP(S): 5 SOLUTION/ DROPS OPHTHALMIC at 07:35

## 2025-05-29 RX ADMIN — Medication 1 DROP(S): at 07:35

## 2025-05-29 RX ADMIN — PHENYLEPHRINE HYDROCHLORIDE 1 DROP(S): 25 SOLUTION OPHTHALMIC at 07:40

## 2025-05-29 NOTE — ASU DISCHARGE PLAN (ADULT/PEDIATRIC) - NS MD DC FALL RISK RISK
For information on Fall & Injury Prevention, visit: [email or web address] OR  [email or web address] OR  https://www.cdc.gov/steadi/patient.html

## 2025-05-29 NOTE — ASU DISCHARGE PLAN (ADULT/PEDIATRIC) - FINANCIAL ASSISTANCE
[facility] provides services at a reduced cost to those who are determined to be eligible through [facility]’s financial assistance program. Information regarding [facility]’s financial assistance program can be found by going to [email or web address] or by calling [phone].

## 2025-05-30 NOTE — OPERATIVE REPORT - OPERATIVE RPOSRT DETAILS
PROCEDURE DATE:    OPERATION:  Phacoemulsification with lens implant, right eye, plus femto laser plus ORA, right eye.    PREOPERATIVE DIAGNOSES:  Nuclear sclerotic cataract- RIGHT eye    POSTOPERATIVE DIAGNOSES:  Nuclear sclerotic cataract - RIGHT eye    DESCRIPTION OF PROCEDURE: The Patient was brought into the femto room.  The eye was then marked at 3 and 9 o'clock positions at the limbus with a marking pen and tetracaine. The patient was in the prone position for femto laser to the right eye.  Suction cup placed on the right eye and docked to the laser.  Capsulorrhexis, nuclear fragmentation, and LRI was performed.  Suction was removed and femto procedure was finished.    The patient was brought into the operating room where MAC anesthesia was induced and the patient was prepped and draped in the usual manner for sterile ophthalmic surgery.  A Santosh speculum was placed into the eye and stabilized with two 4x4s.  Topical lidocaine 4% was instilled over the cornea.  The surgeon sat temporally.  A paracentesis was made and intraocular lidocaine 1% was instilled into the anterior chamber.  Epinephrine was instilled.  Healon GV was then placed into the anterior chamber.  A 2.4mm blade was then used to fashion a biplanar incision temporally.    The femto capsulotomy was then removed without complication.  Hydrodissection and hydrodelineation of the lens was then performed using BSS on a flat-tip cannula.  Phacoemulsification of the nucleus was then performed by sculpting the nucleus in quarters.  The quarters were split with a bimanual technique.   Viscoat was then placed into the crack to enlarge the crack and to shield the endothelium and posterior capsule.  Phacoemulsification of the 4 quadrants was then performed without complication. Hydrodissection of cortical material with flat tip canula with BSS performed.    Mechanical irrigation and aspiration was then performed to remove any remaining cortical material.  Polishing of the anterior and posterior capsules was then performed.  The bag was then inflated using Healon to separate the anterior and posterior leaflets. ORA was performed.  A +21.0 diopter, Odyssey [id number] lens was placed into the bag.   Rotation of the lens to ensure proper placement was then performed.  Irrigation and aspiration of the remaining viscoelastic material was then performed with gentle tapping of the lens to ensure that no viscoelastic material was caught behind the lens.  Miochol was inserted into the eye and after  insertion,  a round pupil was noted at the end of the case.    Careful attention to ensure that there was no wound leak was performed.  A speculum was removed.  A drop of TobraDex was placed into the eye.  A clear shield was then taped over the eye.  The patient returned to the recovery room in stable and improved condition.   PROCEDURE DATE: 05-    OPERATION:  Phacoemulsification with lens implant, right eye, plus femto laser plus ORA, right eye.    PREOPERATIVE DIAGNOSES:  Nuclear sclerotic cataract- RIGHT eye    POSTOPERATIVE DIAGNOSES:  Nuclear sclerotic cataract - RIGHT eye    DESCRIPTION OF PROCEDURE: The Patient was brought into the femto room.  The eye was then marked at 3 and 9 o'clock positions at the limbus with a marking pen and tetracaine. The patient was in the prone position for femto laser to the right eye.  Suction cup placed on the right eye and docked to the laser.  Capsulorrhexis, nuclear fragmentation, and LRI was performed.  Suction was removed and femto procedure was finished.    The patient was brought into the operating room where MAC anesthesia was induced and the patient was prepped and draped in the usual manner for sterile ophthalmic surgery.  A Santosh speculum was placed into the eye and stabilized with two 4x4s.  Topical lidocaine 4% was instilled over the cornea.  The surgeon sat temporally.  A paracentesis was made and intraocular lidocaine 1% was instilled into the anterior chamber.  Epinephrine was instilled.  Healon GV was then placed into the anterior chamber.  A 2.4mm blade was then used to fashion a biplanar incision temporally.    The femto capsulotomy was then removed without complication.  Hydrodissection and hydrodelineation of the lens was then performed using BSS on a flat-tip cannula.  Phacoemulsification of the nucleus was then performed by sculpting the nucleus in quarters.  The quarters were split with a bimanual technique.   Viscoat was then placed into the crack to enlarge the crack and to shield the endothelium and posterior capsule.  Phacoemulsification of the 4 quadrants was then performed without complication. Hydrodissection of cortical material with flat tip canula with BSS performed.    Mechanical irrigation and aspiration was then performed to remove any remaining cortical material.  Polishing of the anterior and posterior capsules was then performed.  The bag was then inflated using Healon to separate the anterior and posterior leaflets. ORA was performed.  A +21.0 diopter, Odyssey [id number] lens was placed into the bag.   Rotation of the lens to ensure proper placement was then performed.  Irrigation and aspiration of the remaining viscoelastic material was then performed with gentle tapping of the lens to ensure that no viscoelastic material was caught behind the lens.  Miochol was inserted into the eye and after  insertion,  a round pupil was noted at the end of the case.    Careful attention to ensure that there was no wound leak was performed.  A speculum was removed.  A drop of TobraDex was placed into the eye.  A clear shield was then taped over the eye.  The patient returned to the recovery room in stable and improved condition.

## (undated) DEVICE — TRANSFORMER INTREPID I/A 0.3MM

## (undated) DEVICE — NUCLEUS HYDRODISSECTOR PEARCE ANGLED 25G X 22MM

## (undated) DEVICE — SOL IRR BAG BSS 500ML

## (undated) DEVICE — PACK CENTURION 2.4MM

## (undated) DEVICE — GLV 6.5 PROTEXIS (WHITE)

## (undated) DEVICE — KNIFE ALCON SLIT INTREPID CLEAR-CUT SAFETY 2.4MM

## (undated) DEVICE — KNIFE ALCON STANDARD FULL HANDLE 15 DEG (PINK)

## (undated) DEVICE — DRAPE MICROSCOPE KNOB COVER SMALL (2 PCS)

## (undated) DEVICE — Device

## (undated) DEVICE — GOWN ROYAL SILK XL

## (undated) DEVICE — MARKING PEN W RULER